# Patient Record
Sex: MALE | Race: WHITE
[De-identification: names, ages, dates, MRNs, and addresses within clinical notes are randomized per-mention and may not be internally consistent; named-entity substitution may affect disease eponyms.]

---

## 2018-03-10 ENCOUNTER — HOSPITAL ENCOUNTER (EMERGENCY)
Dept: HOSPITAL 45 - C.EDB | Age: 53
Discharge: HOME | End: 2018-03-10
Payer: COMMERCIAL

## 2018-03-10 VITALS
HEIGHT: 69.02 IN | BODY MASS INDEX: 22.37 KG/M2 | WEIGHT: 151.02 LBS | BODY MASS INDEX: 22.37 KG/M2 | WEIGHT: 151.02 LBS | HEIGHT: 69.02 IN

## 2018-03-10 VITALS — HEART RATE: 84 BPM | OXYGEN SATURATION: 98 % | SYSTOLIC BLOOD PRESSURE: 150 MMHG | DIASTOLIC BLOOD PRESSURE: 89 MMHG

## 2018-03-10 DIAGNOSIS — I25.2: ICD-10-CM

## 2018-03-10 DIAGNOSIS — I25.10: ICD-10-CM

## 2018-03-10 DIAGNOSIS — Z95.1: ICD-10-CM

## 2018-03-10 DIAGNOSIS — M70.22: Primary | ICD-10-CM

## 2018-03-10 DIAGNOSIS — E78.00: ICD-10-CM

## 2018-03-10 DIAGNOSIS — Z95.5: ICD-10-CM

## 2018-03-10 DIAGNOSIS — R56.9: ICD-10-CM

## 2018-03-10 DIAGNOSIS — I10: ICD-10-CM

## 2018-03-10 NOTE — EMERGENCY ROOM VISIT NOTE
ED Visit Note


First contact with patient:  15:30


CHIEF COMPLAINT: Elbow pain








HISTORY OF PRESENT ILLNESS: This 53-year-old male patient presents to the 

emergency department, ambulatory, complaining of pain in the left elbow which 

began approximately 2 weeks ago.  The patient had a seizure at that time, and 

fell onto the floor, landing on his left elbow.  He states he has had increased 

swelling and pain with palpation or pressure of the elbow.  He has not seen 

anybody for this complaint.  He states there were no x-rays performed after the 

seizure.  The patient rates their pain as sharp and  7/10 when he has it.  The 

patient has taken nothing for relief of the pain.  The patient has not had 

previous fractures to this elbow.  The patient does not have any numbness or 

tingling.  The patient denies any other injuries.








REVIEW OF SYSTEMS: A 6 system review of systems was completed with positives 

and pertinent negatives listed in the HPI. 








ALLERGIES: None








MEDICATIONS: Carvedilol, docusate sodium, Keppra, Prilosec, Zocor, Flomax








PMH: Seizures








SOCIAL HISTORY: The patient lives locally with family.  He denies drug, alcohol 

use.  He admits to current tobacco use.








PHYSICAL EXAM: Vital Signs: Reviewed Nurse's notes, vital signs stable.  GENERAL

: This is a 53-year-old white male, in no acute distress, well-developed, well-

nourished.  SKIN: The skin was without rashes, erythema, edema, warmth, or 

bruising.  Capillary reflex less than 3 seconds.  MUSCULOSKELETAL:  The patient 

is holding their elbow in a flexed position. There is tenderness over the 

posterior aspect/bursa of the left elbow. There is tenderness with palpation 

and pressure of the left elbow.  There is no tenderness of the shoulder, wrist, 

or hand.  The patient is able to give a thumbs up, make an OK sign, and a #3 

with their fingers.  Radial pulse 2+.  NEURO: Patient was alert and oriented to 

person place and time.  Normal sensation to light and sharp touch.  





RADIOLOGY:


L ELBOW MIN 3 VIEWS ROUTINE





HISTORY:  53 years-old Male left elbow pain/swelling s/p fall acute left elbow


pain status post injury





COMPARISON: None available





TECHNIQUE: 3 views of the left elbow





FINDINGS: 


No acute fracture, dislocation or significant degenerative changes. No opaque


foreign body or large joint effusion. Mild dorsal soft tissue swelling adjacent


to the olecranon process.





IMPRESSION: Mild soft tissue swelling without fracture. 











The above report was generated using voice recognition software. It may contain


grammatical, syntax or spelling errors.











Electronically signed by:  Kaden Maldonado M.D.


3/10/2018 3:51 PM





Dictated Date/Time:  3/10/2018 3:50 PM








EMERGENCY DEPARTMENT COURSE: I examined the patient.  Symptoms are consistent 

with an olecranon bursitis.  The patient's daughter insists on an x-ray.  An x-

ray of the left elbow was reviewed myself and read by radiology and shows soft 

tissue swelling, but no bony abnormality or fracture.  Discharge instructions 

reviewed as below.  The patient was discharged home in stable condition.





I attest that I have personally reviewed the patient's current medication list. 


Patient was found to have normal blood pressure on screening and does not 

require follow-up. 





Differential diagnosis includes fracture, sprain, strain, contusion, bursitis, 

gout, malignancy, and others








DIAGNOSIS: Olecranon bursitis of the left elbow


Problem List


Medical Problems:


(1) ACS (acute coronary syndrome)


Status: Chronic  





(2) Acute myocardial infarction


Status: Resolved  





(3) Chest pain, rule out acute myocardial infarction


Status: Resolved  





(4) Hernia


Status: Chronic  





(5) High cholesterol


Status: Chronic  





(6) History of coronary artery disease


Status: Chronic  





(7) Hypertension


Status: Chronic  





Surgical Problems:


(1) Placement of stent in coronary artery


Status: Resolved  





(2) S/P triple vessel bypass


Status: Resolved  











Current/Historical Medications


Scheduled


Acetaminophen Tab (Tylenol), 650 MG PO BID


Carvedilol (Coreg), 12.5 MG PO BID


Diclofenac Sodium (Topical) (Voltaren 1% Top Gel), 1 APPLN TOP QID


Docusate Sodium (Docusate Sodium), 1 CAP PO BID


Levetiracetam (Keppra), 500 MG PO BID


Omeprazole (Prilosec), 40 MG PO DAILY


Simvastatin (Zocor), 40 MG PO QPM


Tamsulosin Hcl (Flomax), 0.4 MG PO DAILY





Allergies


Coded Allergies:  


     No Known Allergies (Unverified , 6/30/16)





Vital Signs











  Date Time  Temp Pulse Resp B/P (MAP) Pulse Ox O2 Delivery O2 Flow Rate FiO2


 


3/10/18 16:29  84 16 150/89 98   


 


3/10/18 15:27 36.9 84 16 139/86 98 Room Air  











Departure Information


Impression





 Primary Impression:  


 Olecranon bursitis of left elbow





Dispostion


Home / Self-Care





Condition


GOOD





Prescriptions





Diclofenac Sodium (Topical) (VOLTAREN 1% TOP GEL) 1 % Gel


1 APPLN TOP QID, #1 TUBE


   Prov: Nayla Null PA-C         3/10/18





Referrals


Flaco Chavez M.D. (PCP)








Brayan Rodrigues D.O.





Patient Instructions


ED Bursitis Elbow Olecranon, My [facility]





Additional Instructions





You were seen in the emergency department today for left elbow pain and 

swelling.  As discussed, I suspect an olecranon bursitis related to the fall.





Please use a compression sleeve over the elbow for the next 2-4 weeks or until 

swelling improves.





He may use Voltaren gel as instructed for anti-inflammatory properties.





Ibuprofen(Motrin, Advil) may be used for fever or pain.  Use 600mg every six 

hours as needed.  Take with food.  Avoid using more than 2400mg in a 24 hour 

period.  Do not use 2400mg per day for more than three consecutive days without 

physician direction.  Prolonged inappropriate use can lead to stomach upset or 

ulcers. 


(AND/OR)


Acetaminophen(Tylenol) may be used for fever or pain.  Use 1000mg every six 

hours as needed.  Avoid using more than 3000mg in a 24 hour period.  





Follow-up with your primary care provider in 1-2 weeks if no improvement in 

symptoms.





Follow-up with orthopedics for worsening symptoms or if no improvement.

## 2018-03-10 NOTE — DIAGNOSTIC IMAGING REPORT
L ELBOW MIN 3 VIEWS ROUTINE



HISTORY:  53 years-old Male left elbow pain/swelling s/p fall acute left elbow

pain status post injury



COMPARISON: None available



TECHNIQUE: 3 views of the left elbow



FINDINGS: 

No acute fracture, dislocation or significant degenerative changes. No opaque

foreign body or large joint effusion. Mild dorsal soft tissue swelling adjacent

to the olecranon process.



IMPRESSION: Mild soft tissue swelling without fracture. 







The above report was generated using voice recognition software. It may contain

grammatical, syntax or spelling errors.







Electronically signed by:  Kaden Maldonado M.D.

3/10/2018 3:51 PM



Dictated Date/Time:  3/10/2018 3:50 PM

## 2018-03-30 ENCOUNTER — HOSPITAL ENCOUNTER (EMERGENCY)
Dept: HOSPITAL 45 - C.EDB | Age: 53
Discharge: HOME | End: 2018-03-30
Payer: COMMERCIAL

## 2018-03-30 VITALS
HEIGHT: 69.02 IN | HEIGHT: 69.02 IN | BODY MASS INDEX: 22.66 KG/M2 | BODY MASS INDEX: 22.66 KG/M2 | WEIGHT: 153 LBS | WEIGHT: 153 LBS

## 2018-03-30 VITALS — OXYGEN SATURATION: 95 % | SYSTOLIC BLOOD PRESSURE: 135 MMHG | HEART RATE: 89 BPM | DIASTOLIC BLOOD PRESSURE: 85 MMHG

## 2018-03-30 VITALS — TEMPERATURE: 98.24 F

## 2018-03-30 DIAGNOSIS — F17.210: ICD-10-CM

## 2018-03-30 DIAGNOSIS — Z79.01: ICD-10-CM

## 2018-03-30 DIAGNOSIS — I25.2: ICD-10-CM
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
## 2018-03-30 DIAGNOSIS — Z86.73: ICD-10-CM

## 2018-03-30 DIAGNOSIS — M70.32: Primary | ICD-10-CM

## 2018-03-30 DIAGNOSIS — R79.1: ICD-10-CM

## 2018-03-30 DIAGNOSIS — Z95.1: ICD-10-CM

## 2018-03-30 DIAGNOSIS — Z82.49: ICD-10-CM

## 2018-03-30 DIAGNOSIS — I48.91: ICD-10-CM

## 2018-03-30 DIAGNOSIS — Z79.899: ICD-10-CM

## 2018-03-30 DIAGNOSIS — I25.10: ICD-10-CM

## 2018-03-30 DIAGNOSIS — E78.00: ICD-10-CM

## 2018-03-30 LAB
ALBUMIN SERPL-MCNC: 3.9 GM/DL (ref 3.4–5)
ALP SERPL-CCNC: 106 U/L (ref 45–117)
ALT SERPL-CCNC: 23 U/L (ref 12–78)
AST SERPL-CCNC: 15 U/L (ref 15–37)
BASOPHILS # BLD: 0.01 K/UL (ref 0–0.2)
BASOPHILS NFR BLD: 0 %
BUN SERPL-MCNC: 25 MG/DL (ref 7–18)
CALCIUM SERPL-MCNC: 9.3 MG/DL (ref 8.5–10.1)
CO2 SERPL-SCNC: 23 MMOL/L (ref 21–32)
CREAT SERPL-MCNC: 1.35 MG/DL (ref 0.6–1.4)
EOS ABS #: 0.01 K/UL (ref 0–0.5)
EOSINOPHIL NFR BLD AUTO: 323 K/UL (ref 130–400)
GLUCOSE SERPL-MCNC: 127 MG/DL (ref 70–99)
HCT VFR BLD CALC: 38.4 % (ref 42–52)
HGB BLD-MCNC: 13.7 G/DL (ref 14–18)
IG#: 0.09 K/UL (ref 0–0.02)
IMM GRANULOCYTES NFR BLD AUTO: 11.5 %
INR PPP: > 10 (ref 0.9–1.1)
LYMPHOCYTES # BLD: 2.48 K/UL (ref 1.2–3.4)
MCH RBC QN AUTO: 32.4 PG (ref 25–34)
MCHC RBC AUTO-ENTMCNC: 35.7 G/DL (ref 32–36)
MCV RBC AUTO: 90.8 FL (ref 80–100)
MONO ABS #: 1.29 K/UL (ref 0.11–0.59)
MONOCYTES NFR BLD: 6 %
NEUT ABS #: 17.64 K/UL (ref 1.4–6.5)
NEUTROPHILS # BLD AUTO: 0 %
NEUTROPHILS NFR BLD AUTO: 82.1 %
PMV BLD AUTO: 10 FL (ref 7.4–10.4)
POTASSIUM SERPL-SCNC: 4 MMOL/L (ref 3.5–5.1)
PROT SERPL-MCNC: 7.8 GM/DL (ref 6.4–8.2)
RED CELL DISTRIBUTION WIDTH CV: 13.6 % (ref 11.5–14.5)
RED CELL DISTRIBUTION WIDTH SD: 45.3 FL (ref 36.4–46.3)
SODIUM SERPL-SCNC: 136 MMOL/L (ref 136–145)
WBC # BLD AUTO: 21.52 K/UL (ref 4.8–10.8)

## 2018-03-30 NOTE — EMERGENCY ROOM VISIT NOTE
History


Report prepared by Dario:  Monse Dewitt


Under the Supervision of:  Dr. Javier Winters M.D.


First contact with patient:  16:12


Chief Complaint:  ELBOW PAIN/INJURY


Stated Complaint:  SWELLING IN ELBOW





History of Present Illness


The patient is a 53 year old male who presents to the Emergency Room with 

complaints of persistent left elbow swelling starting 6 weeks ago. He had a 

fall 6 weeks ago. He did not have a fracture, but has had swelling to the 

elbow. He does not have any pain to the elbow. He was seen by ortho today who 

said that his INR was too high to drain the elbow today. He found out that his 

INR was 8 yesterday. His INR was 2.5 last week. He was told to hold his 

Coumadin for 3 days. He was told to come to the ED by his Coumadin doctor for 

his elbow. He denies any fever, chills, cough, congestion, nausea, vomiting, or 

diarrhea. He denies any change in diet. He has a history of MI and CABG. He 

notes that he was off Cou[place]in for [place] He was started 

back on Coumadin several weeks ago after being [place] from [place]. He was also 

on shots which were stopped last week.





   Source of History:  patient


   Onset:  6 weeks ago


   Position:  elbow (left)


   Quality:  other (swelling)


   Timing:  other (persistent)


   Associated Symptoms:  No fevers, No chills, No cough, No nausea, No vomiting

, No diarrhea





Review of Systems


See HPI for pertinent positives and negatives.  A total of ten systems were 

reviewed and were otherwise negative.





Past Medical & Surgical


Medical Problems:


(1) ACS (acute coronary syndrome)


(2) Acute myocardial infarction


(3) Chest pain, rule out acute myocardial infarction


(4) Hernia


(5) High cholesterol


(6) History of coronary artery disease


(7) Hypertension


Surgical Problems:


(1) Placement of stent in coronary artery


(2) S/P triple vessel bypass








Family History





FH: cardiovascular disease


FH: hyperlipidemia


Hypertension





Social History


Smoking Status:  Current Every Day Smoker


Alcohol Use:  none


Drug Use:  none


Marital Status:  single





Current/Historical Medications


Scheduled


Atorvastatin (Lipitor), 80 MG PO DAILY


Clopidogrel (Plavix), 75 MG PO DAILY


Levetiracetam (Keppra), 750 MG PO BID


Lisinopril (Zestril), 5 MG PO DAILY


Prednisone Tab (Prednisone), 10 MG PO UD


Ranitidine Hcl (Zantac), 150 MG PO BID


Tamsulosin Hcl (Flomax), 0.4 MG PO DAILY


Warfarin Sodium (Coumadin), 3 MG PO UD





Scheduled PRN


Acetaminophen (Acetaminophen), 1,000 MG PO Q6H PRN for Pain


Nitroglycerin (Nitrostat), 0.4 MG UT UD PRN for Chest Pain


Promethazine HCl (Promethazine HCl), 25 MG PO Q6H PRN for Nausea or Vomiting





Allergies


Coded Allergies:  


     No Known Allergies (Unverified , 6/30/16)





Physical Exam


Vital Signs











  Date Time  Temp Pulse Resp B/P (MAP) Pulse Ox O2 Delivery O2 Flow Rate FiO2


 


3/30/18 18:27  89 18 135/85 95 Room Air  


 


3/30/18 17:30  81 20 133/85 95 Room Air  


 


3/30/18 16:33  77 20 125/80 96 Room Air  


 


3/30/18 15:19 36.8 101 18 119/77 94 Room Air  











Physical Exam


GENERAL: Awake, alert, well-appearing, in no distress


HENT: Normocephalic, atraumatic. Oropharynx unremarkable.


EYES: Normal conjunctiva. Sclera non-icteric.


NECK: Supple. No nuchal rigidity. FROM. No JVD.


RESPIRATORY: Clear to auscultation.


CARDIAC: Regular rate, normal rhythm. Extremities warm and well perfused. 

Pulses equal.


ABDOMEN: Soft, non-distended. No tenderness to palpation. No rebound or 

guarding. No masses.


RECTAL: Deferred.


MUSCULOSKELETAL: Chest examination reveals no tenderness. The back is 

symmetrical on inspection without obvious abnormality. There is no CVA 

tenderness to palpation. Swelling of the left olecranon bursa, no warmth or 

tenderness.  Full range of motion at the elbow without discomfort.  Distal PMS 

intact.


LOWER EXTREMITIES: Calves are equal size bilaterally and non-tender. No edema. 

No discoloration. 


NEURO: Normal sensorium. No sensory or motor deficits noted. 


SKIN: No rash or jaundice noted.





Medical Decision & Procedures


Laboratory Results


3/30/18 16:33








Red Blood Count 4.23, Mean Corpuscular Volume 90.8, Mean Corpuscular Hemoglobin 

32.4, Mean Corpuscular Hemoglobin Concent 35.7, Mean Platelet Volume 10.0, 

Neutrophils (%) (Auto) 82.1, Lymphocytes (%) (Auto) 11.5, Monocytes (%) (Auto) 

6.0, Eosinophils (%) (Auto) 0.0, Basophils (%) (Auto) 0.0, Neutrophils # (Auto) 

17.64, Lymphocytes # (Auto) 2.48, Monocytes # (Auto) 1.29, Eosinophils # (Auto) 

0.01, Basophils # (Auto) 0.01





3/30/18 16:33

















Test


  3/30/18


16:33


 


White Blood Count


  21.52 K/uL


(4.8-10.8)


 


Red Blood Count


  4.23 M/uL


(4.7-6.1)


 


Hemoglobin


  13.7 g/dL


(14.0-18.0)


 


Hematocrit 38.4 % (42-52) 


 


Mean Corpuscular Volume


  90.8 fL


()


 


Mean Corpuscular Hemoglobin


  32.4 pg


(25-34)


 


Mean Corpuscular Hemoglobin


Concent 35.7 g/dl


(32-36)


 


Platelet Count


  323 K/uL


(130-400)


 


Mean Platelet Volume


  10.0 fL


(7.4-10.4)


 


Neutrophils (%) (Auto) 82.1 % 


 


Lymphocytes (%) (Auto) 11.5 % 


 


Monocytes (%) (Auto) 6.0 % 


 


Eosinophils (%) (Auto) 0.0 % 


 


Basophils (%) (Auto) 0.0 % 


 


Neutrophils # (Auto)


  17.64 K/uL


(1.4-6.5)


 


Lymphocytes # (Auto)


  2.48 K/uL


(1.2-3.4)


 


Monocytes # (Auto)


  1.29 K/uL


(0.11-0.59)


 


Eosinophils # (Auto)


  0.01 K/uL


(0-0.5)


 


Basophils # (Auto)


  0.01 K/uL


(0-0.2)


 


RDW Standard Deviation


  45.3 fL


(36.4-46.3)


 


RDW Coefficient of Variation


  13.6 %


(11.5-14.5)


 


Immature Granulocyte % (Auto) 0.4 % 


 


Immature Granulocyte # (Auto)


  0.09 K/uL


(0.00-0.02)


 


Prothrombin Time


  > 100.0


SECONDS


 


Prothromb Time International


Ratio > 10.0


(0.9-1.1)


 


Anion Gap


  7.0 mmol/L


(3-11)


 


Est Creatinine Clear Calc


Drug Dose 62.8 ml/min 


 


 


Estimated GFR (


American) 69.5 


 


 


Estimated GFR (Non-


American 59.9 


 


 


BUN/Creatinine Ratio 18.4 (10-20) 


 


Calcium Level


  9.3 mg/dl


(8.5-10.1)


 


Total Bilirubin


  0.2 mg/dl


(0.2-1)


 


Direct Bilirubin


  < 0.1 mg/dl


(0-0.2)


 


Aspartate Amino Transf


(AST/SGOT) 15 U/L (15-37) 


 


 


Alanine Aminotransferase


(ALT/SGPT) 23 U/L (12-78) 


 


 


Alkaline Phosphatase


  106 U/L


()


 


Total Protein


  7.8 gm/dl


(6.4-8.2)


 


Albumin


  3.9 gm/dl


(3.4-5.0)





Laboratory results reviewed by me





Medications Administered











 Medications


  (Trade)  Dose


 Ordered  Sig/Mariama


 Route  Start Time


 Stop Time Status Last Admin


Dose Admin


 


 Phytonadione


  (Mephyton Tab)  5 mg  NOW  STAT


 PO  3/30/18 17:53


 3/30/18 17:55 DC 3/30/18 18:17


5 MG











ED Course


1613: The patient was evaluated in room C12B. A complete history and physical 

exam was performed.





1801: I reevaluated the patient. He mentions that he was started on prednisone 

on Tuesday. I discussed the results with him. He will follow up with his doctor 

on Monday and the coagulation clinic tomorrow. I discussed discharge 

instructions: He verbalized understanding and agreement. The patient is ready 

for discharge.





Medical Decision


I reviewed the patient's past medical history, medications, and the nursing 

notes as described above.





Differential diagnosis: traumatic bursitis, hematoma, fracture, dislocation, 

soft tissue injury. 





The patient is a 53-year-old gentleman with a past medical history of A. fib on 

Coumadin for prior TIAs who presents emergency department with left elbow 

swelling that has been ongoing for the past 4 weeks in the setting of a 

mechanical fall with negative x-ray 6 weeks ago per hpi.  Of note, the patient 

was seen by orthopedics today who told the patient that given that his INR is 

elevated from yesterday at 8 and there is no emergent indication for drainage 

of his elbow that he should follow-up with his doctor to manage his Coumadin 

and then return for drainage.  Per the patient, he called his coagulation 

doctor and was told to go to the emergency department.  While the patient is 

relatively well-appearing, afebrile stable vital signs.  On exam the patient 

has fluctuant area of the olecranon bursa consistent with a traumatic bursitis.

  There is no erythema or warmth.  The patient has full range of motion.  Labs 

notable for supratherapeutic INR greater than 10.  As well as leukocytosis to 

21.  Did discuss the findings with the patient and he reports that he was put 

on prednisone for a rash, which is now resolved, and Tuesday.  Thus, given the 

patient's clinically dry appearance in the setting of prednisone patient's 

elevated leukocytosis is likely related to this.  Additionally this would have 

also provoked the patient's supratherapeutic INR.  Patient reports he stopped 

taking his Coumadin last night after he was told his INR was 8.  However given 

the continued increase in the INR will treat the patient with 5 mg of oral 

vitamin K and he will forego his Coumadin tonight and see coagulation clinic 

tomorrow for a recheck and further instructions.  Additionally, patient will 

follow up with his doctor on Monday to repeat his lab test for surveillance of 

his leukocytosis.  Given that the patient has no other symptoms at this time, 

and denies any recent infectious symptoms no indication for further workup 

given likely related to prednisone.  Once patient's INR is controlled, drainage 

of his likely traumatic bursitis/hemorrhagic bursitis is reasonable.  Otherwise 

no emergent indication for drainage.  Findings and plan for follow-up reviewed 

with patient. Patient agreeable and d/c'd per discharge instructions.





Medication Reconcilliation


Current Medication List:  was personally reviewed by me





Blood Pressure Screening


Patient's blood pressure:  Normal blood pressure


Blood pressure disposition:  Did not require urgent referral





Impression





 Primary Impression:  


 Traumatic bursitis


 Additional Impression:  


 Supratherapeutic INR





Scribe Attestation


The scribe's documentation has been prepared under my direction and personally 

reviewed by me in its entirety. I confirm that the note above accurately 

reflects all work, treatment, procedures, and medical decision making performed 

by me.





Departure Information


Dispostion


Home / Self-Care





Referrals


Flaco Chavez M.D. (PCP)





Patient Instructions


Coumadin, ED Bursitis Elbow Olecranon, ED Hematoma, My [facility]





Additional Instructions





Please follow up with your coagulation clinic tomorrow to repeat your INR and 

receive further instructions on taking your coumadin.


You should also see your primary care physician on Monday for re-evaluation and 

to repeat your lab test including your CBC, BMP, and INR.





Your elbow swelling is likely due to your prior injury and now with your 

elevated Coumadin level.


Otherwise, your exam and lab results did not show signs of an emergent 

condition at this time.





Return to the emergency department for worsening symptoms as described in the 

accompanying instructions.





Problem Qualifiers

## 2018-03-31 ENCOUNTER — HOSPITAL ENCOUNTER (EMERGENCY)
Dept: HOSPITAL 45 - C.EDB | Age: 53
Discharge: HOME | End: 2018-03-31
Payer: COMMERCIAL

## 2018-03-31 VITALS — OXYGEN SATURATION: 96 % | HEART RATE: 86 BPM | DIASTOLIC BLOOD PRESSURE: 92 MMHG | SYSTOLIC BLOOD PRESSURE: 135 MMHG

## 2018-03-31 VITALS
BODY MASS INDEX: 22.53 KG/M2 | WEIGHT: 152.12 LBS | HEIGHT: 69.02 IN | BODY MASS INDEX: 22.53 KG/M2 | HEIGHT: 69.02 IN | WEIGHT: 152.12 LBS

## 2018-03-31 VITALS — TEMPERATURE: 98.06 F

## 2018-03-31 DIAGNOSIS — I25.2: ICD-10-CM

## 2018-03-31 DIAGNOSIS — M25.422: Primary | ICD-10-CM

## 2018-03-31 DIAGNOSIS — Z83.49: ICD-10-CM

## 2018-03-31 DIAGNOSIS — Z82.49: ICD-10-CM

## 2018-03-31 DIAGNOSIS — I10: ICD-10-CM

## 2018-03-31 DIAGNOSIS — Z79.899: ICD-10-CM

## 2018-03-31 DIAGNOSIS — F17.200: ICD-10-CM

## 2018-03-31 DIAGNOSIS — Z79.02: ICD-10-CM

## 2018-03-31 DIAGNOSIS — Z79.01: ICD-10-CM

## 2018-03-31 NOTE — EMERGENCY ROOM VISIT NOTE
History


First contact with patient:  13:57


Chief Complaint:  ELBOW PAIN/INJURY


Stated Complaint:  SWELLED ELBOW





History of Present Illness


The patient is a 53 year old male who presents to the Emergency Room via 

private vehicle with complaints of "swelled elbow".  The patient states that he 

is here because he has swelling overlying the left olecranon process.  He was 

seen here yesterday and had an INR that was around 10 and was instructed that 

he should not be drained at this time because of the elevated INR.  He then had 

it rechecked this morning and it was 2.5.  He then came here to have this 

removed/strain.  He states that this developed after he had a seizure [place] about 1 [place] ago and it is suspected to be traumatic bursitis.  He notes 

minimal pain, and states it is rather just annoying.  He notes he has had x-

rays and there is no fracture.  He rates his overall pain as a 3/10.  He states 

he takes the blood thinners for history of CVA.





Review of Systems


A complete 6-point Review of Systems was discussed with the patient, with 

pertinent positives and negatives listed in the History of Present Illness. All 

remaining Review of Systems questions can be considered negative unless 

otherwise specified.





Past Medical/Surgical History


Medical Problems:


(1) ACS (acute coronary syndrome)


(2) Acute myocardial infarction


(3) Chest pain, rule out acute myocardial infarction


(4) Hernia


(5) High cholesterol


(6) History of coronary artery disease


(7) Hypertension


Surgical Problems:


(1) Placement of stent in coronary artery


(2) S/P triple vessel bypass








Family History





FH: cardiovascular disease


FH: hyperlipidemia


Hypertension





Social History


Smoking Status:  Current Every Day Smoker


Alcohol Use:  none


Drug Use:  none


Marital Status:  single





Current/Historical Medications


Scheduled


Atorvastatin (Lipitor), 80 MG PO DAILY


Clopidogrel (Plavix), 75 MG PO DAILY


Levetiracetam (Keppra), 750 MG PO BID


Lisinopril (Zestril), 5 MG PO DAILY


Prednisone Tab (Prednisone), 10 MG PO UD


Ranitidine Hcl (Zantac), 150 MG PO BID


Tamsulosin Hcl (Flomax), 0.4 MG PO DAILY


Warfarin Sodium (Coumadin), 3 MG PO UD





Scheduled PRN


Acetaminophen (Acetaminophen), 1,000 MG PO Q6H PRN for Pain


Nitroglycerin (Nitrostat), 0.4 MG UT UD PRN for Chest Pain


Promethazine HCl (Promethazine HCl), 25 MG PO Q6H PRN for Nausea or Vomiting





Physical Exam


Vital Signs











  Date Time  Temp Pulse Resp B/P (MAP) Pulse Ox O2 Delivery O2 Flow Rate FiO2


 


3/31/18 14:35  86 18 135/92 96 Room Air  


 


3/31/18 13:33 36.7 87 18 114/77 96 Room Air  











Physical Exam


VITAL SIGNS - Vital signs and nursing notes were reviewed.  Stable.


GENERAL -53-year-old male appearing his stated age who is in no acute distress. 

Communicates well with provider and answers questions appropriately.


SKIN - Without rashes.  No meningeal petechial rash.  There is a large 4 cm in 

diameter region overlying the patient's left olecranon process.  It is slightly 

fluctuant.


EXTREMITIES - No clubbing or peripheral cyanosis.  Minimal tenderness overlying 

the patient's left olecranon process region.  No bony tenderness.  There is 

suspected bursa inflammation overlying the left olecranon process.  He is 

neurovascularly intact in this region.





Medical Decision & Procedures


Medical Decision


Patient was seen and evaluated as above.  He presents to us today with 

suspected traumatic bursitis overlying the left olecranon process.  Review was 

performed of nursing notes and vital signs.  Review was had a previous visits.  

Previous x-ray was reviewed.  No fracture.  The concern is that with the 

patient being anticoagulated that this traumatic bursitis could also be 

hematoma.  I informed him at this time there is no evidence of infection and by 

attempted to drain this could cause infection/persistent bleeding given his INR 

elevation.  I informed him that at this time rest, elevation and compression is 

recommended with orthopedic follow-up.  He was given an Ace wrap for 

compression on the area.  This was with good fit.  Benefit versus risk of 

draining was explained to the patient in great detail, and I also discussed 

this with the attending physician and we collectively agreed at this time we 

should refrain from draining and rather approach this conservatively with close 

orthopedic follow-up.  The patient was educated upon management, had questions 

answered prior to discharge, and was discharged home in good condition.





Case was discussed with the attending physician.





In the evaluation and treatment of this patient, the following differential 

diagnoses were considered: Forearm Contusion, Radial Head Fracture, Radial 

Styloid Process Fracture, Ulnar Styloid Process Fracture, Radius Fracture, 

Ulnar Fracture, Tennis Elbow, Golfer's Elbow, or Elbow Fracture.





Impression





 Primary Impression:  


 Swelling of joint, elbow, left





Departure Information


Dispostion


Home / Self-Care





Condition


GOOD





Referrals


Flaco Chavez M.D. (PCP)





Patient Instructions


My [facility]





Additional Instructions





You have been treated in the Emergency Department for Elbow Pain.





Continue to keep compresses on this as we discussed.





Call your family doctor/orthopedic surgeon to schedule follow-up.





Please return with any new/concerning symptoms.





Return to the Emergency Department if your current symptoms worsen despite 

treatment course outlined above, or if you develop any of the following symptoms

: intractable pain despite aforementioned treatment course or new onset of 

numbness or tingling of the arm.

## 2018-05-02 ENCOUNTER — HOSPITAL ENCOUNTER (EMERGENCY)
Dept: HOSPITAL 45 - C.EDB | Age: 53
Discharge: HOME | End: 2018-05-02
Payer: COMMERCIAL

## 2018-05-02 VITALS — DIASTOLIC BLOOD PRESSURE: 73 MMHG | HEART RATE: 96 BPM | OXYGEN SATURATION: 94 % | SYSTOLIC BLOOD PRESSURE: 100 MMHG

## 2018-05-02 VITALS
WEIGHT: 158.29 LBS | BODY MASS INDEX: 23.44 KG/M2 | WEIGHT: 158.29 LBS | HEIGHT: 69.02 IN | BODY MASS INDEX: 23.44 KG/M2 | HEIGHT: 69.02 IN

## 2018-05-02 VITALS — TEMPERATURE: 98.42 F

## 2018-05-02 DIAGNOSIS — R42: ICD-10-CM

## 2018-05-02 DIAGNOSIS — Z79.899: ICD-10-CM

## 2018-05-02 DIAGNOSIS — I25.2: ICD-10-CM

## 2018-05-02 DIAGNOSIS — F17.200: ICD-10-CM

## 2018-05-02 DIAGNOSIS — Z79.01: ICD-10-CM

## 2018-05-02 DIAGNOSIS — Z82.49: ICD-10-CM

## 2018-05-02 DIAGNOSIS — R04.0: Primary | ICD-10-CM

## 2018-05-02 DIAGNOSIS — I10: ICD-10-CM

## 2018-05-02 LAB
BASOPHILS # BLD: 0.04 K/UL (ref 0–0.2)
BASOPHILS NFR BLD: 0.3 %
BUN SERPL-MCNC: 24 MG/DL (ref 7–18)
CALCIUM SERPL-MCNC: 8 MG/DL (ref 8.5–10.1)
CO2 SERPL-SCNC: 24 MMOL/L (ref 21–32)
CREAT SERPL-MCNC: 1.33 MG/DL (ref 0.6–1.4)
EOS ABS #: 0.39 K/UL (ref 0–0.5)
EOSINOPHIL NFR BLD AUTO: 247 K/UL (ref 130–400)
GLUCOSE SERPL-MCNC: 93 MG/DL (ref 70–99)
HCT VFR BLD CALC: 38.1 % (ref 42–52)
HGB BLD-MCNC: 13.2 G/DL (ref 14–18)
IG#: 0.04 K/UL (ref 0–0.02)
IMM GRANULOCYTES NFR BLD AUTO: 25.5 %
INR PPP: 6.6 (ref 0.9–1.1)
LYMPHOCYTES # BLD: 3.47 K/UL (ref 1.2–3.4)
MCH RBC QN AUTO: 31.4 PG (ref 25–34)
MCHC RBC AUTO-ENTMCNC: 34.6 G/DL (ref 32–36)
MCV RBC AUTO: 90.7 FL (ref 80–100)
MONO ABS #: 1.01 K/UL (ref 0.11–0.59)
MONOCYTES NFR BLD: 7.4 %
NEUT ABS #: 8.68 K/UL (ref 1.4–6.5)
NEUTROPHILS # BLD AUTO: 2.9 %
NEUTROPHILS NFR BLD AUTO: 63.6 %
NRBC BLD AUTO-RTO: 0.4 %
NUCLEATED RED BLOOD CELL ABS: 0.06 K/UL (ref 0–0)
PMV BLD AUTO: 9.5 FL (ref 7.4–10.4)
POTASSIUM SERPL-SCNC: 3.8 MMOL/L (ref 3.5–5.1)
PTT PATIENT: 62.5 SECONDS (ref 21–31)
RED CELL DISTRIBUTION WIDTH CV: 13.6 % (ref 11.5–14.5)
RED CELL DISTRIBUTION WIDTH SD: 44.6 FL (ref 36.4–46.3)
SODIUM SERPL-SCNC: 137 MMOL/L (ref 136–145)
WBC # BLD AUTO: 13.63 K/UL (ref 4.8–10.8)

## 2018-05-02 NOTE — DIAGNOSTIC IMAGING REPORT
ULTRASOUND LEFT LOWER EXTREMITY VENOUS 



CLINICAL HISTORY: Left leg pain.



COMPARISON STUDY: Left lower extremity venous ultrasound dated 10/10/2015.



TECHNIQUE: Real-time, grayscale, and color Doppler sonography of the deep veins

of the left lower extremity was performed from the inguinal crease to the calf.

Compression and augmentation were utilized. 



FINDINGS: There is no sonographic evidence of deep venous thrombosis identified

in the left lower extremity. The common femoral, superficial femoral, and

popliteal veins are patent and normally compressible. The greater saphenous vein

and the profunda femoris vein at the junction with the common femoral vein are

clear. The visualized calf veins are patent.



IMPRESSION: There is no sonographic evidence of deep venous thrombosis

identified in the left lower extremity.







Electronically signed by:  Andrés Mai M.D.

5/2/2018 10:17 PM



Dictated Date/Time:  5/2/2018 10:16 PM

## 2018-05-02 NOTE — EMERGENCY ROOM VISIT NOTE
History


Report prepared by Dario:  Juventino Teran


Under the Supervision of:  Dr. Abundio Trujillo M.D.


First contact with patient:  21:34


Chief Complaint:  NOSE BLEED (MINOR)


Stated Complaint:  BLEEDING NOSE





History of Present Illness


The patient is a 53 year old male who presents to the Emergency Room with 

complaints of persistent nosebleed for two hours. He notes dizziness and left 

leg pain. He states his left leg was cramping. He states he was doing yard work 

yesterday on his knees. He has a history of blood clot in left knee. He reports 

a history stroke following the blood clot. He has a history of triple bypass 

surgery prior to the stroke. He denies any chest pain, shortness of breath, 

numbness, or weakness. Per mother, the patient was spitting up blood earlier. 

He is currently taking Coumadin.





   Source of History:  patient, parent


   Onset:  two hours


   Position:  nose


   Quality:  other (bleed)


   Timing:  other (persistent)


   Associated Symptoms:  No chest pain, No SOB, No weakness, No numbness


Note:


Notes spitting up blood, dizziness and left leg pain.





Review of Systems


See HPI for pertinent positives & negatives. A total of 10 systems reviewed and 

were otherwise negative.





Past Medical & Surgical


Medical Problems:


(1) ACS (acute coronary syndrome)


(2) Acute myocardial infarction


(3) Chest pain, rule out acute myocardial infarction


(4) Hernia


(5) High cholesterol


(6) History of coronary artery disease


(7) Hypertension


Surgical Problems:


(1) Placement of stent in coronary artery


(2) S/P triple vessel bypass





Old medical records were reviewed. Nurse's notes were reviewed and I agree with.





Family History





FH: cardiovascular disease


FH: hyperlipidemia


Hypertension





Social History


Smoking Status:  Current Every Day Smoker


Alcohol Use:  none


Drug Use:  none


Marital Status:  single


Housing Status:  lives with family





Current/Historical Medications


Scheduled


Atorvastatin (Lipitor), 80 MG PO DAILY


Clopidogrel (Plavix), 75 MG PO DAILY


Levetiracetam (Keppra), 750 MG PO BID


Lisinopril (Zestril), 5 MG PO DAILY


Prednisone Tab (Prednisone), 10 MG PO UD


Ranitidine Hcl (Zantac), 150 MG PO BID


Tamsulosin Hcl (Flomax), 0.4 MG PO DAILY


Warfarin Sodium (Coumadin), 3 MG PO UD





Scheduled PRN


Acetaminophen (Acetaminophen), 1,000 MG PO Q6H PRN for Pain


Nitroglycerin (Nitrostat), 0.4 MG UT UD PRN for Chest Pain


Promethazine HCl (Promethazine HCl), 25 MG PO Q6H PRN for Nausea or Vomiting





Allergies


Coded Allergies:  


     No Known Allergies (Unverified , 3/31/18)





Physical Exam


Vital Signs











  Date Time  Temp Pulse Resp B/P (MAP) Pulse Ox O2 Delivery O2 Flow Rate FiO2


 


5/2/18 23:35  96 16 100/73 94   


 


5/2/18 23:00  96 20 120/63 94 Room Air  


 


5/2/18 20:58 36.9 103 20 113/74 95 Room Air  











Physical Exam


General: Non-ill appearing middle-aged male in no acute distress. 


HEENT: Normal cephalic atraumatic.  Pupils are equal round and reactive to 

light.  Extraocular movements are intact.  Nose: Left naris has minimal dried 

blood, no active bleeding. Oropharynx is pink with moist mucous membranes. No 

blood present. No swelling of the mouth lips or tongue.


Neck: Supple with a midline trachea.  No meningeal signs or stiffness, no JVD 

or bruits. No Stridor.


Chest: Clear to auscultation bilaterally.  No wheezes or rhonchi.  No increased 

work of breathing.


Heart: regular rate and rhythm. 


Abdomen: Soft nontender, nondistended without rebound guarding or rigidity.  


Extremities: No cyanosis clubbing or edema. No calf tenderness or assymetry.  

Left leg previous scars from surgery, normal motor and sensation, palpable 

distal pulse, soft compartments.


Spine/Back. Non tender to palpation. No CVA tenderness


Skin: Good turgor without rashes.


Neurologic exam: Cranial nerves two through 12 are intact.  Motor and sensation 

are intact and symmetrical throughout.





Medical Decision & Procedures


ER Provider


Diagnostic Interpretation:


ULTRASOUND LEFT LOWER EXTREMITY VENOUS 





CLINICAL HISTORY: Left leg pain.





COMPARISON STUDY: Left lower extremity venous ultrasound dated 10/10/2015.





TECHNIQUE: Real-time, grayscale, and color Doppler sonography of the deep veins


of the left lower extremity was performed from the inguinal crease to the calf.


Compression and augmentation were utilized. 





FINDINGS: There is no sonographic evidence of deep venous thrombosis identified


in the left lower extremity. The common femoral, superficial femoral, and


popliteal veins are patent and normally compressible. The greater saphenous vein


and the profunda femoris vein at the junction with the common femoral vein are


clear. The visualized calf veins are patent.





IMPRESSION: There is no sonographic evidence of deep venous thrombosis


identified in the left lower extremity.











Electronically signed by:  Andrés Mai M.D.


5/2/2018 10:17 PM





Dictated Date/Time:  5/2/2018 10:16 PM





Laboratory Results


5/2/18 21:50








Red Blood Count 4.20, Mean Corpuscular Volume 90.7, Mean Corpuscular Hemoglobin 

31.4, Mean Corpuscular Hemoglobin Concent 34.6, Mean Platelet Volume 9.5, 

Neutrophils (%) (Auto) 63.6, Lymphocytes (%) (Auto) 25.5, Monocytes (%) (Auto) 

7.4, Eosinophils (%) (Auto) 2.9, Basophils (%) (Auto) 0.3, Neutrophils # (Auto) 

8.68, Lymphocytes # (Auto) 3.47, Monocytes # (Auto) 1.01, Eosinophils # (Auto) 

0.39, Basophils # (Auto) 0.04





5/2/18 21:50

















Test


  5/2/18


21:50


 


White Blood Count


  13.63 K/uL


(4.8-10.8)


 


Red Blood Count


  4.20 M/uL


(4.7-6.1)


 


Hemoglobin


  13.2 g/dL


(14.0-18.0)


 


Hematocrit 38.1 % (42-52) 


 


Mean Corpuscular Volume


  90.7 fL


()


 


Mean Corpuscular Hemoglobin


  31.4 pg


(25-34)


 


Mean Corpuscular Hemoglobin


Concent 34.6 g/dl


(32-36)


 


Platelet Count


  247 K/uL


(130-400)


 


Mean Platelet Volume


  9.5 fL


(7.4-10.4)


 


Neutrophils (%) (Auto) 63.6 % 


 


Lymphocytes (%) (Auto) 25.5 % 


 


Monocytes (%) (Auto) 7.4 % 


 


Eosinophils (%) (Auto) 2.9 % 


 


Basophils (%) (Auto) 0.3 % 


 


Neutrophils # (Auto)


  8.68 K/uL


(1.4-6.5)


 


Lymphocytes # (Auto)


  3.47 K/uL


(1.2-3.4)


 


Monocytes # (Auto)


  1.01 K/uL


(0.11-0.59)


 


Eosinophils # (Auto)


  0.39 K/uL


(0-0.5)


 


Basophils # (Auto)


  0.04 K/uL


(0-0.2)


 


RDW Standard Deviation


  44.6 fL


(36.4-46.3)


 


RDW Coefficient of Variation


  13.6 %


(11.5-14.5)


 


Immature Granulocyte % (Auto) 0.3 % 


 


Immature Granulocyte # (Auto)


  0.04 K/uL


(0.00-0.02)


 


Nucleated RBC Absolute Count


(auto) 0.06 K/uL


(0-0)


 


Nucleated Red Blood Cells % 0.4 % 


 


Prothrombin Time


  66.8 SECONDS


(9.0-12.0)


 


Prothromb Time International


Ratio 6.6 (0.9-1.1) 


 


 


Activated Partial


Thromboplast Time 62.5 SECONDS


(21.0-31.0)


 


Partial Thromboplastin Ratio 2.4 


 


Anion Gap


  6.0 mmol/L


(3-11)


 


Est Creatinine Clear Calc


Drug Dose 65.0 ml/min 


 


 


Estimated GFR (


American) 70.7 


 


 


Estimated GFR (Non-


American 61.0 


 


 


BUN/Creatinine Ratio 17.7 (10-20) 


 


Calcium Level


  8.0 mg/dl


(8.5-10.1)


 


Chemistry Specimen Hemolysis  





Laboratory studies as stated above per my review.





Medications Administered











 Medications


  (Trade)  Dose


 Ordered  Sig/Mariama


 Route  Start Time


 Stop Time Status Last Admin


Dose Admin


 


 Phytonadione


  (Mephyton Tab)  2.5 mg  NOW  STAT


 PO  5/2/18 23:20


 5/2/18 23:21 DC 5/2/18 23:31


2.5 MG











ED Course


2136: Past medical records reviewed. The patient was evaluated in room C12B, 

and a complete history and physical examination were performed.





2320: Ordered Mephyton 2.5 mg PO





2318: I reassessed the patient at this time. He is feeling better and resting 

comfortably. No recurrent nosebleed. I discussed the results and treatment plan 

with the patient. I answered all pertaining questions that he had. He expressed 

understanding and verbalized agreement. The patient will be discharged home.





Medical Decision


Differentials include, but are not limited to: nosebleed, anemia, 

anticoagulation, DVT, and musculoskeletal. 





This patient comes in as described above he is complaining of left-sided 

nosebleed.  Only bled a little bit and stopped he is on Coumadin he also has 

some pain behind his left knee and is concerned about a DVT does have a history 

of DVT and is on Coumadin.  Is no artificial heart valve.  He looks well and is 

asymptomatic otherwise at present.  He has no blood in oropharynx there is no 

no definite spot to cauterize in his nose.  He has no chest pain or shortness 

of breath.  Blood work was obtained his INR was found to be supratherapeutic 

over 6.  He has no evidence of DVT in his leg.  He is not not seemingly anemic.

  I told him to hold his Coumadin for the next day or so and get rechecked 

tomorrow by his regular doctor and get his blood work rechecked I also given 

vitamin K 2.5 mg p.o.  I encouraged him to return if increasing pain or bleeding

, worsening symptoms, fever chills, any new problems or concerns.  He was happy 

with the plan and discharged home





Medication Reconcilliation


Current Medication List:  was personally reviewed by me





Blood Pressure Screening


Patient's blood pressure:  Normal blood pressure





Impression





 Primary Impression:  


 Nosebleed


 Additional Impressions:  


 Supratherapeutic international normalized ratio (INR)


 Left leg pain





Scribe Attestation


The scribe's documentation has been prepared under my direction and personally 

reviewed by me in its entirety. I confirm that the note above accurately 

reflects all work, treatment, procedures, and medical decision making performed 

by me.





Departure Information


Dispostion


Home / Self-Care





Referrals


Flaco Chavez M.D. (PCP)





Forms


HOME CARE DOCUMENTATION FORM,                                                 

               IMPORTANT VISIT INFORMATION, WORK / SCHOOL INSTRUCTIONS





Patient Instructions


My [facility]





Additional Instructions





Rest


Hold your coumadin for the next day and see your doctor tommorrow for recheck 

and recheck of your labs





REturn if: worsening of symptoms, further bleeding, any new problems or concerns





Problem Qualifiers

## 2018-08-27 ENCOUNTER — HOSPITAL ENCOUNTER (EMERGENCY)
Dept: HOSPITAL 45 - C.EDB | Age: 53
Discharge: HOME | End: 2018-08-27
Payer: COMMERCIAL

## 2018-08-27 VITALS — DIASTOLIC BLOOD PRESSURE: 97 MMHG | HEART RATE: 70 BPM | OXYGEN SATURATION: 96 % | SYSTOLIC BLOOD PRESSURE: 115 MMHG

## 2018-08-27 VITALS
WEIGHT: 160.5 LBS | BODY MASS INDEX: 23.77 KG/M2 | HEIGHT: 69.02 IN | HEIGHT: 69.02 IN | WEIGHT: 160.5 LBS | BODY MASS INDEX: 23.77 KG/M2

## 2018-08-27 VITALS — TEMPERATURE: 98.78 F

## 2018-08-27 DIAGNOSIS — K40.90: Primary | ICD-10-CM

## 2018-08-27 DIAGNOSIS — I10: ICD-10-CM

## 2018-08-27 DIAGNOSIS — Z95.5: ICD-10-CM

## 2018-08-27 DIAGNOSIS — E78.00: ICD-10-CM

## 2018-08-27 DIAGNOSIS — I25.10: ICD-10-CM

## 2018-08-27 DIAGNOSIS — Z79.01: ICD-10-CM

## 2018-08-27 DIAGNOSIS — Z82.49: ICD-10-CM

## 2018-08-27 DIAGNOSIS — Z95.1: ICD-10-CM

## 2018-08-27 DIAGNOSIS — F17.210: ICD-10-CM

## 2018-08-27 DIAGNOSIS — Z79.899: ICD-10-CM

## 2018-08-27 LAB
ALBUMIN SERPL-MCNC: 3.6 GM/DL (ref 3.4–5)
ALP SERPL-CCNC: 108 U/L (ref 45–117)
ALT SERPL-CCNC: 19 U/L (ref 12–78)
AST SERPL-CCNC: 13 U/L (ref 15–37)
BASOPHILS # BLD: 0.03 K/UL (ref 0–0.2)
BASOPHILS NFR BLD: 0.3 %
BUN SERPL-MCNC: 15 MG/DL (ref 7–18)
CALCIUM SERPL-MCNC: 8.3 MG/DL (ref 8.5–10.1)
CO2 SERPL-SCNC: 23 MMOL/L (ref 21–32)
CREAT SERPL-MCNC: 1.58 MG/DL (ref 0.6–1.4)
EOS ABS #: 0.24 K/UL (ref 0–0.5)
EOSINOPHIL NFR BLD AUTO: 229 K/UL (ref 130–400)
GLUCOSE SERPL-MCNC: 85 MG/DL (ref 70–99)
HCT VFR BLD CALC: 41.3 % (ref 42–52)
HGB BLD-MCNC: 14.4 G/DL (ref 14–18)
IG#: 0.01 K/UL (ref 0–0.02)
IMM GRANULOCYTES NFR BLD AUTO: 31.5 %
LIPASE: 119 U/L (ref 73–393)
LYMPHOCYTES # BLD: 2.8 K/UL (ref 1.2–3.4)
MCH RBC QN AUTO: 30.7 PG (ref 25–34)
MCHC RBC AUTO-ENTMCNC: 34.9 G/DL (ref 32–36)
MCV RBC AUTO: 88.1 FL (ref 80–100)
MONO ABS #: 0.46 K/UL (ref 0.11–0.59)
MONOCYTES NFR BLD: 5.2 %
NEUT ABS #: 5.35 K/UL (ref 1.4–6.5)
NEUTROPHILS # BLD AUTO: 2.7 %
NEUTROPHILS NFR BLD AUTO: 60.2 %
PMV BLD AUTO: 10.6 FL (ref 7.4–10.4)
POTASSIUM SERPL-SCNC: 3.8 MMOL/L (ref 3.5–5.1)
PROT SERPL-MCNC: 7.2 GM/DL (ref 6.4–8.2)
RED CELL DISTRIBUTION WIDTH CV: 14.7 % (ref 11.5–14.5)
RED CELL DISTRIBUTION WIDTH SD: 47.2 FL (ref 36.4–46.3)
SODIUM SERPL-SCNC: 138 MMOL/L (ref 136–145)
WBC # BLD AUTO: 8.89 K/UL (ref 4.8–10.8)

## 2018-08-27 NOTE — EMERGENCY ROOM VISIT NOTE
History


First contact with patient:  11:51


Chief Complaint:  ABDOMINAL PAIN


Stated Complaint:  HERNIA


Nursing Triage Summary:  


Pt c/o hernia at RLQ x 2 weeks after moving a dresser. Pt states that he was 


seen by his PCP last week, was told to come to ER, but couldn't until today 


because he didn't have gas for his car. Pt also c/o not having a normal BM 

since 


Wednesday, states that he is going, just less than usual.





History of Present Illness


The patient is a 53 year old male who presents to the Emergency Room with 

complaints of a painful lump in his right groin.  Patient reports a prior 

history of bilateral inguinal hernias several years ago.  He was recently seen 

by his family doctor for the pain, and was instructed to come to the emergency 

department for evaluation of a recurrent hernia.  The patient reports that he 

did not have transportation to the emergency department until today.  He has 

had this discomfort for the past 2 weeks.  Patient reports constipation, but 

denies any nausea or vomiting.  The patient reports that he has an appointment 

to see a [facility] surgeon on 9/6/18.  He rates his discomfort an 8 out of 10.  

He reports that his family doctor did write a small prescription for pain 

medications, but has run out.





Review of Systems


10 system review was performed and was negative except for pertinent positives 

and negatives as indicated in history of present illness





Past Medical/Surgical History


Medical Problems:


(1) ACS (acute coronary syndrome)


(2) Acute myocardial infarction


(3) Chest pain, rule out acute myocardial infarction


(4) Hernia


(5) High cholesterol


(6) History of coronary artery disease


(7) Hypertension


(8) Inguinal hernia


Surgical Problems:


(1) Placement of stent in coronary artery


(2) S/P triple vessel bypass








Family History





FH: cardiovascular disease


FH: hyperlipidemia


Hypertension





Social History


Smoking Status:  Current Every Day Smoker


Alcohol Use:  none


Drug Use:  none


Marital Status:  single


Housing Status:  lives with family





Current/Historical Medications


Scheduled


Atorvastatin (Lipitor), 80 MG PO DAILY


Levetiracetam (Keppra), 750 MG PO BID


Lisinopril (Zestril), 5 MG PO DAILY


Warfarin Sodium (Coumadin), 3 MG PO UD





Scheduled PRN


Acetaminophen (Acetaminophen), 1,000 MG PO Q6H PRN for Pain


Nitroglycerin (Nitrostat), 0.4 MG UT UD PRN for Chest Pain


Oxycodone Ir (Roxicodone Ir), 1 TAB PO Q4H PRN for Pain





Physical Exam


Vital Signs











  Date Time  Temp Pulse Resp B/P (MAP) Pulse Ox O2 Delivery O2 Flow Rate FiO2


 


8/27/18 14:31  70 20 115/97 96   


 


8/27/18 14:03  77 20 109/78 96   


 


8/27/18 11:06 37.1 91 18 129/86 97 Room Air  











Physical Exam


CONSTITUTIONAL:  Healthy and well nourished.  Alert and oriented X 3 with 

positive affect.  Patient does not appear in any acute distress on exam.


HEENT:  Normocephalic, atraumatic.  Pupils equal, round and reactive.  


NECK:  Full active range of motion without discomfort.


RESPIRATORY:  Clear to auscultation bilaterally with no wheezing, crackles, 

rhonchi or stridor.


CARDIOVASCULAR:  Regular rate and rhythm with no murmurs, rubs or gallops.


GASTROINTESTINAL:  Bowel sounds present in all quadrants.  Abdomen is soft and 

nontender to palpation.


GENITOURINARY: Examination shows a palpable bulge in the right inguinal canal.  

It is not reducible.  Further exam does not show any scrotal masses or bulging 

masses within the right inguinal canal with Valsalva maneuvers.  The mass is 

tender to palpation.


MUSCULOSKELETAL:  Full range of motion of all joints without discomfort.


INTEGUMENTARY:  No rash or other significant dermatologic conditions noted.


NEUROLOGIC:  No focal neurologic deficits noted.





Medical Decision & Procedures


Laboratory Results


8/27/18 12:05








Red Blood Count 4.69, Mean Corpuscular Volume 88.1, Mean Corpuscular Hemoglobin 

30.7, Mean Corpuscular Hemoglobin Concent 34.9, Mean Platelet Volume 10.6, 

Neutrophils (%) (Auto) 60.2, Lymphocytes (%) (Auto) 31.5, Monocytes (%) (Auto) 

5.2, Eosinophils (%) (Auto) 2.7, Basophils (%) (Auto) 0.3, Neutrophils # (Auto) 

5.35, Lymphocytes # (Auto) 2.80, Monocytes # (Auto) 0.46, Eosinophils # (Auto) 

0.24, Basophils # (Auto) 0.03





8/27/18 12:05

















Test


  8/27/18


12:05


 


White Blood Count


  8.89 K/uL


(4.8-10.8)


 


Red Blood Count


  4.69 M/uL


(4.7-6.1)


 


Hemoglobin


  14.4 g/dL


(14.0-18.0)


 


Hematocrit 41.3 % (42-52) 


 


Mean Corpuscular Volume


  88.1 fL


()


 


Mean Corpuscular Hemoglobin


  30.7 pg


(25-34)


 


Mean Corpuscular Hemoglobin


Concent 34.9 g/dl


(32-36)


 


Platelet Count


  229 K/uL


(130-400)


 


Mean Platelet Volume


  10.6 fL


(7.4-10.4)


 


Neutrophils (%) (Auto) 60.2 % 


 


Lymphocytes (%) (Auto) 31.5 % 


 


Monocytes (%) (Auto) 5.2 % 


 


Eosinophils (%) (Auto) 2.7 % 


 


Basophils (%) (Auto) 0.3 % 


 


Neutrophils # (Auto)


  5.35 K/uL


(1.4-6.5)


 


Lymphocytes # (Auto)


  2.80 K/uL


(1.2-3.4)


 


Monocytes # (Auto)


  0.46 K/uL


(0.11-0.59)


 


Eosinophils # (Auto)


  0.24 K/uL


(0-0.5)


 


Basophils # (Auto)


  0.03 K/uL


(0-0.2)


 


RDW Standard Deviation


  47.2 fL


(36.4-46.3)


 


RDW Coefficient of Variation


  14.7 %


(11.5-14.5)


 


Immature Granulocyte % (Auto) 0.1 % 


 


Immature Granulocyte # (Auto)


  0.01 K/uL


(0.00-0.02)


 


Urine Color YELLOW 


 


Urine Appearance CLEAR (CLEAR) 


 


Urine pH 6.5 (4.5-7.5) 


 


Urine Specific Gravity


  1.008


(1.000-1.030)


 


Urine Protein NEG (NEG) 


 


Urine Glucose (UA) NEG (NEG) 


 


Urine Ketones NEG (NEG) 


 


Urine Occult Blood NEG (NEG) 


 


Urine Nitrite NEG (NEG) 


 


Urine Bilirubin NEG (NEG) 


 


Urine Urobilinogen NEG (NEG) 


 


Urine Leukocyte Esterase NEG (NEG) 


 


Anion Gap


  8.0 mmol/L


(3-11)


 


Est Creatinine Clear Calc


Drug Dose 54.7 ml/min 


 


 


Estimated GFR (


American) 57.4 


 


 


Estimated GFR (Non-


American 49.6 


 


 


BUN/Creatinine Ratio 9.4 (10-20) 


 


Calcium Level


  8.3 mg/dl


(8.5-10.1)


 


Total Bilirubin


  0.4 mg/dl


(0.2-1)


 


Aspartate Amino Transf


(AST/SGOT) 13 U/L (15-37) 


 


 


Alanine Aminotransferase


(ALT/SGPT) 19 U/L (12-78) 


 


 


Alkaline Phosphatase


  108 U/L


()


 


Total Protein


  7.2 gm/dl


(6.4-8.2)


 


Albumin


  3.6 gm/dl


(3.4-5.0)


 


Globulin


  3.6 gm/dl


(2.5-4.0)


 


Albumin/Globulin Ratio 1.0 (0.9-2) 


 


Lipase


  119 U/L


()











Labs were reviewed and were grossly normal.





Medications Administered











 Medications


  (Trade)  Dose


 Ordered  Sig/Mariama


 Route  Start Time


 Stop Time Status Last Admin


Dose Admin


 


 Morphine Sulfate


  (MoRPHine


 SULFATE INJ)  4 mg  NOW  STAT


 IV  8/27/18 13:50


 8/27/18 13:51 DC 8/27/18 14:02


4 MG


 


 Ondansetron HCl


  (Zofran Inj)  4 mg  NOW  STAT


 IV  8/27/18 13:50


 8/27/18 13:51 DC 8/27/18 14:01


4 MG











ED Course


Patient history and physical exam were performed.  Nurse's notes were reviewed.

  Vital signs were reviewed and were normal.  Labs were ordered per nursing 

protocol.  Lab results were reviewed and were grossly normal.  The patient was 

unable to provide a urine sample.  It was an extended wait to be seen because 

of ED volume.  When I went in to evaluate the patient, the patient reports that 

he would prefer to leave.  Education was provided regarding inguinal hernias.  

At this point, I explained that his symptoms are not consistent with a 

strangulated hernia.  I did offer to perform additional imaging studies for 

confirmation, but the patient refused.  He reports that he would rather just 

follow up with his surgeons appointment on 9/6/18.  He was instructed to return 

to the emergency department for any progressively worsening swelling, pain, 

fever or vomiting.  He did request a prescription for something for pain.  

Review of the [place] Prescription Drug Monitoring Program does not show 

any other concerning red flags.  The patient will be provided a prescription 

for OxyIR 5 mg, dispense #15 with no refills.  The patient was happy with plan 

of care, voiced understanding of all discharge instructions, and rated his 

discomfort a 5 out of 10 at the time of discharge.  The patient was 

administered morphine 4 mg and Zofran 4 mg IVP just prior to discharge.





Medical Decision








[place] Drug Monitoring Program


Search Results:  patient reviewed within database, no issues identified





Medication Reconcilliation


Current Medication List:  was personally reviewed by me





Blood Pressure Screening


Patient's blood pressure:  Normal blood pressure





Impression





 Primary Impression:  


 Inguinal hernia of right side without obstruction or gangrene





Departure Information


Dispostion


Home / Self-Care





Condition


GOOD





Prescriptions





Oxycodone Ir (Roxicodone Ir) 5 Mg Tab


1 TAB PO Q4H Y for Pain, #15 TAB


   For Initial Treatment


   Prov: Solitario Logan PA         8/27/18





Forms


Call Back Authorization, HOME CARE DOCUMENTATION FORM,                         

                                       IMPORTANT VISIT INFORMATION





Patient Instructions


[facility]





Additional Instructions





Continue with Colace stool softeners.


You may also add MiraLAX for additional relief.


Ibuprofen or Tylenol as needed for baseline pain relief.


OxyIR if needed for worse pain.


Do not drink alcohol or drive while taking OxyIR.


Follow-up with your surgeon as scheduled.